# Patient Record
Sex: MALE | NOT HISPANIC OR LATINO | Employment: OTHER | URBAN - METROPOLITAN AREA
[De-identification: names, ages, dates, MRNs, and addresses within clinical notes are randomized per-mention and may not be internally consistent; named-entity substitution may affect disease eponyms.]

---

## 2024-07-20 ENCOUNTER — APPOINTMENT (OUTPATIENT)
Dept: GENERAL RADIOLOGY | Facility: CLINIC | Age: 74
End: 2024-07-20
Attending: EMERGENCY MEDICINE
Payer: COMMERCIAL

## 2024-07-20 ENCOUNTER — HOSPITAL ENCOUNTER (EMERGENCY)
Facility: CLINIC | Age: 74
Discharge: HOME | End: 2024-07-20
Attending: EMERGENCY MEDICINE | Admitting: EMERGENCY MEDICINE
Payer: COMMERCIAL

## 2024-07-20 VITALS
HEART RATE: 79 BPM | TEMPERATURE: 97.6 F | DIASTOLIC BLOOD PRESSURE: 73 MMHG | RESPIRATION RATE: 18 BRPM | OXYGEN SATURATION: 100 % | SYSTOLIC BLOOD PRESSURE: 137 MMHG

## 2024-07-20 DIAGNOSIS — R55 SYNCOPE, UNSPECIFIED SYNCOPE TYPE: ICD-10-CM

## 2024-07-20 DIAGNOSIS — R06.02 SHORTNESS OF BREATH: ICD-10-CM

## 2024-07-20 LAB
ALBUMIN SERPL BCG-MCNC: 3.8 G/DL (ref 3.5–5.2)
ALBUMIN UR-MCNC: 10 MG/DL
ALP SERPL-CCNC: 75 U/L (ref 40–150)
ALT SERPL W P-5'-P-CCNC: 10 U/L (ref 0–70)
ANION GAP SERPL CALCULATED.3IONS-SCNC: 8 MMOL/L (ref 7–15)
APPEARANCE UR: CLEAR
AST SERPL W P-5'-P-CCNC: 14 U/L (ref 0–45)
ATRIAL RATE - MUSE: 64 BPM
BASOPHILS # BLD AUTO: 0.1 10E3/UL (ref 0–0.2)
BASOPHILS NFR BLD AUTO: 1 %
BILIRUB SERPL-MCNC: 1.2 MG/DL
BILIRUB UR QL STRIP: NEGATIVE
BUN SERPL-MCNC: 17.1 MG/DL (ref 8–23)
CALCIUM SERPL-MCNC: 9.2 MG/DL (ref 8.8–10.4)
CHLORIDE SERPL-SCNC: 106 MMOL/L (ref 98–107)
COLOR UR AUTO: ABNORMAL
CREAT SERPL-MCNC: 1.31 MG/DL (ref 0.67–1.17)
DIASTOLIC BLOOD PRESSURE - MUSE: NORMAL MMHG
EGFRCR SERPLBLD CKD-EPI 2021: 57 ML/MIN/1.73M2
EOSINOPHIL # BLD AUTO: 0.2 10E3/UL (ref 0–0.7)
EOSINOPHIL NFR BLD AUTO: 2 %
ERYTHROCYTE [DISTWIDTH] IN BLOOD BY AUTOMATED COUNT: 12.3 % (ref 10–15)
GLUCOSE SERPL-MCNC: 127 MG/DL (ref 70–99)
GLUCOSE UR STRIP-MCNC: NEGATIVE MG/DL
HCO3 SERPL-SCNC: 25 MMOL/L (ref 22–29)
HCT VFR BLD AUTO: 37.5 % (ref 40–53)
HGB BLD-MCNC: 12.4 G/DL (ref 13.3–17.7)
HGB UR QL STRIP: NEGATIVE
HOLD SPECIMEN: NORMAL
HYALINE CASTS: 14 /LPF
IMM GRANULOCYTES # BLD: 0 10E3/UL
IMM GRANULOCYTES NFR BLD: 0 %
INTERPRETATION ECG - MUSE: NORMAL
KETONES UR STRIP-MCNC: NEGATIVE MG/DL
LACTATE SERPL-SCNC: 1.9 MMOL/L (ref 0.7–2)
LEUKOCYTE ESTERASE UR QL STRIP: NEGATIVE
LYMPHOCYTES # BLD AUTO: 2.2 10E3/UL (ref 0.8–5.3)
LYMPHOCYTES NFR BLD AUTO: 30 %
MCH RBC QN AUTO: 28.8 PG (ref 26.5–33)
MCHC RBC AUTO-ENTMCNC: 33.1 G/DL (ref 31.5–36.5)
MCV RBC AUTO: 87 FL (ref 78–100)
MONOCYTES # BLD AUTO: 0.5 10E3/UL (ref 0–1.3)
MONOCYTES NFR BLD AUTO: 7 %
MUCOUS THREADS #/AREA URNS LPF: PRESENT /LPF
NEUTROPHILS # BLD AUTO: 4.4 10E3/UL (ref 1.6–8.3)
NEUTROPHILS NFR BLD AUTO: 60 %
NITRATE UR QL: NEGATIVE
NRBC # BLD AUTO: 0 10E3/UL
NRBC BLD AUTO-RTO: 0 /100
P AXIS - MUSE: 64 DEGREES
PH UR STRIP: 8 [PH] (ref 5–7)
PLATELET # BLD AUTO: 202 10E3/UL (ref 150–450)
POTASSIUM SERPL-SCNC: 4.7 MMOL/L (ref 3.4–5.3)
PR INTERVAL - MUSE: 156 MS
PROT SERPL-MCNC: 7.1 G/DL (ref 6.4–8.3)
QRS DURATION - MUSE: 80 MS
QT - MUSE: 432 MS
QTC - MUSE: 445 MS
R AXIS - MUSE: 45 DEGREES
RBC # BLD AUTO: 4.3 10E6/UL (ref 4.4–5.9)
RBC URINE: <1 /HPF
SODIUM SERPL-SCNC: 139 MMOL/L (ref 135–145)
SP GR UR STRIP: 1.02 (ref 1–1.03)
SQUAMOUS EPITHELIAL: <1 /HPF
SYSTOLIC BLOOD PRESSURE - MUSE: NORMAL MMHG
T AXIS - MUSE: 51 DEGREES
TROPONIN T SERPL HS-MCNC: 13 NG/L
TROPONIN T SERPL HS-MCNC: 13 NG/L
UROBILINOGEN UR STRIP-MCNC: NORMAL MG/DL
VENTRICULAR RATE- MUSE: 64 BPM
WBC # BLD AUTO: 7.3 10E3/UL (ref 4–11)
WBC URINE: 2 /HPF

## 2024-07-20 PROCEDURE — 96361 HYDRATE IV INFUSION ADD-ON: CPT

## 2024-07-20 PROCEDURE — 96360 HYDRATION IV INFUSION INIT: CPT

## 2024-07-20 PROCEDURE — 93005 ELECTROCARDIOGRAM TRACING: CPT

## 2024-07-20 PROCEDURE — 81001 URINALYSIS AUTO W/SCOPE: CPT | Performed by: EMERGENCY MEDICINE

## 2024-07-20 PROCEDURE — 36415 COLL VENOUS BLD VENIPUNCTURE: CPT | Performed by: EMERGENCY MEDICINE

## 2024-07-20 PROCEDURE — 99291 CRITICAL CARE FIRST HOUR: CPT | Mod: 25

## 2024-07-20 PROCEDURE — 71046 X-RAY EXAM CHEST 2 VIEWS: CPT

## 2024-07-20 PROCEDURE — 85025 COMPLETE CBC W/AUTO DIFF WBC: CPT | Performed by: EMERGENCY MEDICINE

## 2024-07-20 PROCEDURE — 83605 ASSAY OF LACTIC ACID: CPT | Performed by: EMERGENCY MEDICINE

## 2024-07-20 PROCEDURE — 80053 COMPREHEN METABOLIC PANEL: CPT | Performed by: EMERGENCY MEDICINE

## 2024-07-20 PROCEDURE — 258N000003 HC RX IP 258 OP 636: Performed by: EMERGENCY MEDICINE

## 2024-07-20 PROCEDURE — 84484 ASSAY OF TROPONIN QUANT: CPT | Performed by: EMERGENCY MEDICINE

## 2024-07-20 RX ADMIN — SODIUM CHLORIDE 1000 ML: 9 INJECTION, SOLUTION INTRAVENOUS at 12:12

## 2024-07-20 ASSESSMENT — ACTIVITIES OF DAILY LIVING (ADL)
ADLS_ACUITY_SCORE: 35

## 2024-07-20 NOTE — DISCHARGE INSTRUCTIONS
As we discussed, no clear cause of your symptoms was found here today, and it is very important that you check in with your regular doctor or any regular physician in the next 1 week for a checkup.  Please come back to the ER immediately with any other concerns you have or any new symptoms, and do your best to stay hydrated as it does look like you felt much better after the IV fluids.

## 2024-07-20 NOTE — ED TRIAGE NOTES
Pt comes in today at [facility] when he had a syncopal episode   Pt was caught by son on fall   Pt and family unsure is he has been dehydrated lately  Pt is dizzy with postion change     18g right ac - 250 ml saline bolus via ems   Bgl -161

## 2024-07-20 NOTE — ED PROVIDER NOTES
Emergency Department Note      History of Present Illness     Chief Complaint  Syncope    HPI  Nathanael Pandya is a 74 year old male who presents to the emergency room after an episode of shortness of breath and possible near syncope at the sample earlier today.  [place], and had a normal breakfast per his family, and was seated at the edge of the temple when he began to lose consciousness.  It sounds like he had trouble breathing during this phase as well, but the patient came back to his normal self shortly thereafter.  No seizure-like activity, patient denies any chest pain or shortness of breath now or before, and states he feels well enough currently and would like to go home.      He did not fall out of his chair, no trauma.    Independent Historian  Yes patient's family is at the bedside and confirms the above history    Review of External Notes  N/A      Past Medical History   Medical History and Problem List  No past medical history on file.    Medications  No current outpatient medications on file.      Surgical History   No past surgical history on file.      Physical Exam   Patient Vitals for the past 24 hrs:   BP Temp Temp src Pulse Resp SpO2   07/20/24 1150 127/71 97.6  F (36.4  C) Oral 62 18 96 %       Physical Exam  Vitals: reviewed by me  General: Pt seen on hospital [facility], pleasant, cooperative, and alert to conversation  Eyes: Tracking well, clear conjunctiva BL  ENT: MMM, midline trachea.  No evidence of trauma to head or neck.  Lungs: No tachypnea, no accessory muscle use. No respiratory distress.   CV: Rate as above  Abd: Soft, non tender, no guarding, no rebound. Non distended  MSK: no joint effusion.  No evidence of trauma  Skin: No rash  Neuro: Clear speech and no facial droop.  Psych: Not RIS, no e/o AH/VH      Diagnostics   Lab Results   Labs Ordered and Resulted from Time of ED Arrival to Time of ED Departure   COMPREHENSIVE METABOLIC PANEL -  Abnormal       Result Value    Sodium 139      Potassium 4.7      Carbon Dioxide (CO2) 25      Anion Gap 8      Urea Nitrogen 17.1      Creatinine 1.31 (*)     GFR Estimate 57 (*)     Calcium 9.2      Chloride 106      Glucose 127 (*)     Alkaline Phosphatase 75      AST 14      ALT 10      Protein Total 7.1      Albumin 3.8      Bilirubin Total 1.2     ROUTINE UA WITH MICROSCOPIC REFLEX TO CULTURE - Abnormal    Color Urine Light Yellow      Appearance Urine Clear      Glucose Urine Negative      Bilirubin Urine Negative      Ketones Urine Negative      Specific Gravity Urine 1.017      Blood Urine Negative      pH Urine 8.0 (*)     Protein Albumin Urine 10 (*)     Urobilinogen Urine Normal      Nitrite Urine Negative      Leukocyte Esterase Urine Negative      Mucus Urine Present (*)     RBC Urine <1      WBC Urine 2      Squamous Epithelials Urine <1      Hyaline Casts Urine 14 (*)    CBC WITH PLATELETS AND DIFFERENTIAL - Abnormal    WBC Count 7.3      RBC Count 4.30 (*)     Hemoglobin 12.4 (*)     Hematocrit 37.5 (*)     MCV 87      MCH 28.8      MCHC 33.1      RDW 12.3      Platelet Count 202      % Neutrophils 60      % Lymphocytes 30      % Monocytes 7      % Eosinophils 2      % Basophils 1      % Immature Granulocytes 0      NRBCs per 100 WBC 0      Absolute Neutrophils 4.4      Absolute Lymphocytes 2.2      Absolute Monocytes 0.5      Absolute Eosinophils 0.2      Absolute Basophils 0.1      Absolute Immature Granulocytes 0.0      Absolute NRBCs 0.0     LACTIC ACID WHOLE BLOOD WITH 1X REPEAT IN 2 HR WHEN >2 - Normal    Lactic Acid, Initial 1.9     TROPONIN T, HIGH SENSITIVITY - Normal    Troponin T, High Sensitivity 13     TROPONIN T, HIGH SENSITIVITY - Normal    Troponin T, High Sensitivity 13         Imaging  XR Chest 2 Views   Final Result   IMPRESSION: Negative chest.          EKG   ECG results from 07/20/24   EKG 12-lead, tracing only     Value    Systolic Blood Pressure     Diastolic Blood Pressure      Ventricular Rate 64    Atrial Rate 64    OH Interval 156    QRS Duration 80        QTc 445    P Axis 64    R AXIS 45    T Axis 51    Interpretation ECG      Sinus rhythm  Nonspecific ST abnormality  Reviewed by Benjy JACOB           Independent Interpretation  Yes I have independently reviewed the patient's chest x-ray, no obvious pneumothorax noted.      ED Course    Medications Administered  Medications   sodium chloride 0.9% BOLUS 1,000 mL (0 mLs Intravenous Stopped 7/20/24 1533)       Procedures  Procedures       Social Determinants of Health adding to complexity of care  None      Disposition  The patient was discharged.       Medical Decision Making / Diagnosis           MIPS     None        MDM  This is a very pleasant 74-year-old gentleman who presents to the emergency room with what sounds like a syncope like presentation.  The patient did not have any shaking movements, has no persistent shortness of breath or chest pain, and it certainly does seem like witnesses report events consistent with a near syncopal episode.  He does look much improved here after IV fluids so it certainly may have been due to at least in part, having some degree of dehydration.  He is mentating appropriately, he has passed his road test, his troponins are flat and I do not think that he has any evidence of sepsis.  No tachycardia or chest pain or hypoxia to evidence of PE, and the patient will be in [place] for the next 2 weeks and we have discussed the plan for how the patient can see one of his family members physicians here in [place] in the next 1 to 2 weeks before he travels on to [place].  Red flags when to come back to the ER were discussed in detail, family appears highly supportive and feels very comfortable with the patient going home.        ICD-10 Codes:    ICD-10-CM    1. Syncope, unspecified syncope type  R55       2. Shortness of breath  R06.02            Discharge Medications  There are no  discharge medications for this patient.             Trae Burleson MD  07/20/24 9963